# Patient Record
Sex: MALE | Race: OTHER | NOT HISPANIC OR LATINO | URBAN - METROPOLITAN AREA
[De-identification: names, ages, dates, MRNs, and addresses within clinical notes are randomized per-mention and may not be internally consistent; named-entity substitution may affect disease eponyms.]

---

## 2022-05-09 ENCOUNTER — EMERGENCY (EMERGENCY)
Facility: HOSPITAL | Age: 47
LOS: 1 days | Discharge: HOME | End: 2022-05-09
Attending: STUDENT IN AN ORGANIZED HEALTH CARE EDUCATION/TRAINING PROGRAM
Payer: MEDICAID

## 2022-05-09 VITALS
SYSTOLIC BLOOD PRESSURE: 127 MMHG | HEIGHT: 73 IN | RESPIRATION RATE: 18 BRPM | HEART RATE: 81 BPM | DIASTOLIC BLOOD PRESSURE: 75 MMHG | WEIGHT: 270.07 LBS | OXYGEN SATURATION: 98 % | TEMPERATURE: 99 F

## 2022-05-09 PROCEDURE — 99283 EMERGENCY DEPT VISIT LOW MDM: CPT | Mod: 25

## 2022-05-09 PROCEDURE — 73562 X-RAY EXAM OF KNEE 3: CPT | Mod: 26,LT

## 2022-05-09 PROCEDURE — 29505 APPLICATION LONG LEG SPLINT: CPT

## 2022-05-09 PROCEDURE — 73562 X-RAY EXAM OF KNEE 3: CPT

## 2022-05-09 RX ORDER — IBUPROFEN 200 MG
600 TABLET ORAL ONCE
Refills: 0 | Status: COMPLETED | OUTPATIENT
Start: 2022-05-09 | End: 2022-05-09

## 2022-05-09 RX ADMIN — Medication 600 MILLIGRAM(S): at 18:25

## 2022-05-09 NOTE — ED ADULT NURSE NOTE - NSIMPLEMENTINTERV_GEN_ALL_ED
Implemented All Fall Risk Interventions:  [place] to call system. Call bell, personal items and telephone within reach. Instruct patient to call for assistance. Room bathroom lighting operational. Non-slip footwear when patient is off stretcher. Physically safe environment: no spills, clutter or unnecessary equipment. Stretcher in lowest position, wheels locked, appropriate side rails in place. Provide visual cue, wrist band, yellow gown, etc. Monitor gait and stability. Monitor for mental status changes and reorient to person, place, and time. Review medications for side effects contributing to fall risk. Reinforce activity limits and safety measures with patient and family.

## 2022-05-09 NOTE — ED PROVIDER NOTE - NS ED ATTENDING STATEMENT MOD
This was a shared visit with the MARIA EUGENIA. I reviewed and verified the documentation and independently performed the documented:

## 2022-05-09 NOTE — ED PROVIDER NOTE - OBJECTIVE STATEMENT
47 y.o. male coming in with left knee pain after a trip and fall; while at work yesterday.  Pt unsure exactly how he fell but he twisted his knee and fell to the ground.  Was able to bear weight at first but now is currently unable to put any pressure on his LLE, say the pain feels similar to when he injured the meniscus many years ago.  Says the right knee is hurting as well but not nearly as much as the left.  Pain worse with movement, nothing makes it better.

## 2022-05-09 NOTE — ED PROVIDER NOTE - CLINICAL SUMMARY MEDICAL DECISION MAKING FREE TEXT BOX
47 M w/ no pmh p/w L knee pain s/p twisting injury where knee went outwards and pt fell w/ blunt trauma on the knee, pt w/ no cp, no sob, no nausea no vomiting. No numbness nor weakness, pt w/ no fevers, no chills. On exam, pt is awake and alert in no distress, breathing comfortably, pt w/ 2+ DP pulse, pt w/ weakness in knee flexion/extension on the L side, pt w/ no hip pain, no ankle pain, pt w/ intact ankle felxion and extension intact caprefill in the toes, intact sensation in the bilateral legs, plan for xray crutches and outpt follow up w/ sports.

## 2022-05-09 NOTE — ED PROVIDER NOTE - NSFOLLOWUPINSTRUCTIONS_ED_ALL_ED_FT
1- Motrin 600 mg every 6 hours for pain  2- Follow up with Dr Black   3- If you have any worsening pain, numbness, weakness, or any new or worsening complaints come back to the ER immediately

## 2022-05-09 NOTE — ED PROVIDER NOTE - CARE PROVIDER_API CALL
Anastacio Black)  Orthopaedic Surgery  [address], [address]  [place] [address]  Phone: [phone]  Fax: [phone]  Follow Up Time:

## 2022-05-09 NOTE — ED PROVIDER NOTE - MUSCULOSKELETAL, MLM
RLE full ROM non tender throughout all compartments soft.  Left hip and ankle NT full ROM.  Left knee neg [place]/gregory/ant/post drawer.  Pt able to hold knee out in extension.  Unwilling to flex secondary to pain, passive flexion to 45 degrees elicits pain.

## 2022-05-09 NOTE — ED PROVIDER NOTE - PATIENT PORTAL LINK FT
You can access the FollowMyHealth Patient Portal offered by [facility] by registering at the following website: [email or web address]. By joining [facility]’s FollowMyHealth portal, you will also be able to view your health information using other applications (apps) compatible with our system.

## 2022-05-09 NOTE — ED PROVIDER NOTE - RAPID ASSESSMENT
47 M w/ PSHx of L knee meniscus repair 3 years ago p/w L knee pain and difficulty ambulating s/p fall last night. Pt also reports R knee pain, but L knee pain is more severe. Denies striking head and LOC. Endorses taking Acetaminophen 3 hours ago.    **Pt seen in the waiting room via teletriage by Miriam Cervantes, documentation completed by Simon Holbrook. Pt to be sent to main ED for further evaluation - all orders placed to be followed by MD in the main ED**  Scribe Statement: [place] MENDIOLA Cole (scribe), attest that this documentation has been prepared under the direction and in the presence of Miriam Cervantes 47 M w/ PSHx of L knee meniscus repair 3 years ago p/w b/L knee pain and difficulty ambulating s/p fall last night. Pt also reports R knee pain, but L knee pain is more severe. Denies striking head and LOC. Endorses taking Acetaminophen 3 hours ago. no pain in ankles/hips. no other injuries. reports pain feels similar to when he had torn meniscus. unable to bear weight requires assistance from wife to walk.    **Pt seen in the waiting room via teletriage by Miriam Cervantes, documentation completed by Quin -Simon Bojorquez. Pt to be sent to main ED for further evaluation - all orders placed to be followed by MD in the main ED**  Scribe Statement: I, Simon Bojorquez (scribe), attest that this documentation has been prepared under the direction and in the presence of Miriam CervantesC: The scribe's documentation has been prepared under my direction and personally reviewed by me in its entirety. I confirm that the note above accurately reflects history obtained.   Patient was rapidly assessed via telemedicine encounter; a limited history was obtained. The patient will be seen and further examined and worked up in the main ED and their care will be completed by the main ED team. Receiving team will follow up on labs, analgesia, any clinical imaging, and perform reassessment and disposition of the patient as clinically indicated. All decisions regarding the progression of care will be made at their discretion.

## 2022-05-09 NOTE — ED ADULT NURSE NOTE - OBJECTIVE STATEMENT
Pt complaining of left knee pain s/p mechanical fall yesterday.  Pt was walking down steps at work and slipped landing on the side of his left knee, did not fall hit head or lose consciousness.  Has not been able to ambulate since due to pain.  Hx of L knee meniscus repair 3 years ago.  No hip or ankle pain.  Took tylenol 500mg 3 hours ago.

## 2024-09-27 NOTE — ED PROCEDURE NOTE - CPROC ED PHYSICIAN PRESENCE1
Brought to the ED by .  History of seizures.  History of recent head trauma.      Patient states not feeling well.  Went to Urgent Care this morning and prescribed an antibiotic which has yet to .  Jorge went home after going to Urgent Care, was dizzy and woke up on the floor.  States their one of their children called their dad who brought patient to the ED.     I was present during the key portion of the procedure.
